# Patient Record
Sex: MALE | Race: WHITE | Employment: STUDENT | URBAN - METROPOLITAN AREA
[De-identification: names, ages, dates, MRNs, and addresses within clinical notes are randomized per-mention and may not be internally consistent; named-entity substitution may affect disease eponyms.]

---

## 2019-03-19 ENCOUNTER — THERAPY VISIT (OUTPATIENT)
Dept: PHYSICAL THERAPY | Facility: CLINIC | Age: 24
End: 2019-03-19
Payer: COMMERCIAL

## 2019-03-19 DIAGNOSIS — Z98.890 S/P ACL RECONSTRUCTION: ICD-10-CM

## 2019-03-19 DIAGNOSIS — R60.0 LOCALIZED EDEMA: ICD-10-CM

## 2019-03-19 DIAGNOSIS — S83.511D RUPTURE OF ANTERIOR CRUCIATE LIGAMENT OF RIGHT KNEE, SUBSEQUENT ENCOUNTER: ICD-10-CM

## 2019-03-19 DIAGNOSIS — M25.561 ACUTE PAIN OF RIGHT KNEE: ICD-10-CM

## 2019-03-19 PROBLEM — S83.511A RIGHT ACL TEAR: Status: ACTIVE | Noted: 2019-03-19

## 2019-03-19 PROCEDURE — 97016 VASOPNEUMATIC DEVICE THERAPY: CPT | Mod: GP | Performed by: PHYSICAL THERAPIST

## 2019-03-19 PROCEDURE — 97161 PT EVAL LOW COMPLEX 20 MIN: CPT | Mod: GP | Performed by: PHYSICAL THERAPIST

## 2019-03-19 PROCEDURE — 97110 THERAPEUTIC EXERCISES: CPT | Mod: GP | Performed by: PHYSICAL THERAPIST

## 2019-03-19 ASSESSMENT — ACTIVITIES OF DAILY LIVING (ADL)
STAND: ACTIVITY IS FAIRLY DIFFICULT
WEAKNESS: THE SYMPTOM AFFECTS MY ACTIVITY SEVERELY
KNEEL ON THE FRONT OF YOUR KNEE: I AM UNABLE TO DO THE ACTIVITY
HOW_WOULD_YOU_RATE_THE_CURRENT_FUNCTION_OF_YOUR_KNEE_DURING_YOUR_USUAL_DAILY_ACTIVITIES_ON_A_SCALE_FROM_0_TO_100_WITH_100_BEING_YOUR_LEVEL_OF_KNEE_FUNCTION_PRIOR_TO_YOUR_INJURY_AND_0_BEING_THE_INABILITY_TO_PERFORM_ANY_OF_YOUR_USUAL_DAILY_ACTIVITIES?: 10
SIT WITH YOUR KNEE BENT: I AM UNABLE TO DO THE ACTIVITY
GO UP STAIRS: ACTIVITY IS FAIRLY DIFFICULT
GO DOWN STAIRS: ACTIVITY IS FAIRLY DIFFICULT
LIMPING: THE SYMPTOM PREVENTS ME FROM ALL DAILY ACTIVITIES
WALK: I AM UNABLE TO DO THE ACTIVITY
SQUAT: I AM UNABLE TO DO THE ACTIVITY
SWELLING: THE SYMPTOM AFFECTS MY ACTIVITY MODERATELY
RISE FROM A CHAIR: ACTIVITY IS SOMEWHAT DIFFICULT
AS_A_RESULT_OF_YOUR_KNEE_INJURY,_HOW_WOULD_YOU_RATE_YOUR_CURRENT_LEVEL_OF_DAILY_ACTIVITY?: SEVERELY ABNORMAL
GIVING WAY, BUCKLING OR SHIFTING OF KNEE: THE SYMPTOM PREVENTS ME FROM ALL DAILY ACTIVITIES
STIFFNESS: THE SYMPTOM AFFECTS MY ACTIVITY SEVERELY
HOW_WOULD_YOU_RATE_THE_OVERALL_FUNCTION_OF_YOUR_KNEE_DURING_YOUR_USUAL_DAILY_ACTIVITIES?: SEVERELY ABNORMAL
KNEE_ACTIVITY_OF_DAILY_LIVING_SUM: 14
PAIN: THE SYMPTOM AFFECTS MY ACTIVITY SEVERELY
KNEE_ACTIVITY_OF_DAILY_LIVING_SCORE: 20
RAW_SCORE: 14

## 2019-03-19 NOTE — LETTER
[facility]  [address]  [place] [address]  [phone]    2019    Re: Benton Bernardo   :   1995  MRN:  [id number]   REFERRING PHYSICIAN:   [facility]    Date of Initial Evaluation:  3/19/19  Visits:  Rxs Used: 1  Reason for Referral:     Acute pain of right knee  Rupture of anterior cruciate ligament of right knee, subsequent encounter  S/P ACL reconstruction  Localized [facility] Initial Evaluation    Subjective:  The history is provided by the patient.   Benton Bernardo is a 23 year old male with a right knee condition.  Condition occurred with:  A twist.  Condition occurred: during recreation/sport.  This is a new and chronic condition  3/18/19 patient underwent right ACL-R with BTB autograft.  He originally injured his knee in Fall  while playing flag football..    Patient reports pain:  Anterior and in the joint.    Pain is described as aching and sharp and is constant and reported as 6/10.  Associated symptoms:  Buckling/giving out, edema, loss of strength, loss of motion/stiffness and numbness. Pain is the same all the time.  Symptoms are exacerbated by ascending stairs, descending stairs, bending/squatting, kneeling, walking and standing and relieved by ice, rest and bracing/immobilizing.  Since onset symptoms are gradually improving.  Special tests:  MRI and x-ray.  Previous treatment includes surgery.  Improvement with previous treatment: too early to assess improvement.  General health as reported by patient is excellent.  Pertinent medical history includes:  History of fractures.  Medical allergies: no.  Other surgeries include:  Orthopedic surgery (hands).  Current medications:  Pain medication (antibiotic).  Current occupation is Student, .  Patient is currently not working due to present treatment problem.  Primary job tasks include:  Other and prolonged  sitting (pushing/pulling, computer work).  Barriers include:  Transportation.  Red flags:  None as reported by the patient.    Objective:  KNEE:    PROM:   L  R   Hyperextension 2 0   Extension 0 4   Flexion 140+ 65     Strength:   L R   HIP     Flex 5/5 nt   Ext 5/5 nt   Abd 5/5 nt   KNEE     Flex 5/5 nt   Ext 5/5 Nt, fair quad set     Palpation: incisional tenderness    Patellar tracking: fair superior patellar translation with quad set    Edema:  Significant swelling noted in right knee    Observation: wound check shows drainage at site of proximal/lateral incision, clean in anterior incision    Gait: presents NWB with bilateral axillary crutches with R knee locked in full extension in brace    Assessment/Plan:    Patient is a 23 year old male with right side knee complaints.    Patient has the following significant findings with corresponding treatment plan.                Diagnosis 1:  S/p Right ACL-R with BTB autograft    Pain -  hot/cold therapy, self management and education  Decreased ROM/flexibility - manual therapy, therapeutic exercise and home program  Decreased strength - therapeutic exercise, therapeutic activities and home program  Decreased proprioception - neuro re-education, therapeutic activities and home program  Edema - vasopneumatics, cold therapy and self management/home program  Impaired gait - gait training and home program  Impaired muscle performance - neuro re-education and home program  Decreased function - therapeutic activities and home program    Therapy Evaluation Codes:   1) History comprised of:   Personal factors that impact the plan of care:      None.    Comorbidity factors that impact the plan of care are:      Pain at night/rest.     Medications impacting care: Pain.  2) Examination of Body Systems comprised of:   Body structures and functions that impact the plan of care:      Knee.   Activity limitations that impact the plan of care are:      Bathing, Driving, Jumping,  Running, Sports, Squatting/kneeling, Stairs, Standing and Walking.  3) Clinical presentation characteristics are:   Stable/Uncomplicated.  4) Decision-Making    Low complexity using standardized patient assessment instrument and/or measureable assessment of functional outcome.    Cumulative Therapy Evaluation is: Low complexity.  Previous and current functional limitations:  (See Goal Flow Sheet for this information)    Short term and Long term goals: (See Goal Flow Sheet for this information)   Communication ability:  Patient appears to be able to clearly communicate and understand verbal and written communication and follow directions correctly.  Treatment Explanation - The following has been discussed with the patient:   RX ordered/plan of care  Anticipated outcomes  Possible risks and side effects  This patient would benefit from PT intervention to resume normal activities.   Rehab potential is good.    Frequency:  2 X week, once daily  Duration:  for 1 week, then patient will return to school in [place], [place] for 6 weeks to continue PT. He will resume PT at this site in May 2019  Discharge Plan:  Achieve all LTG.  Independent in home treatment program.  Reach maximal therapeutic benefit.    Thank you for your referral.    INQUIRIES  Therapist: Nam Haider, PT, DPT, SCS  [facility]  [address] [place] [address]  Phone: [phone]  Fax: [phone]

## 2019-03-19 NOTE — PROGRESS NOTES
[facility] Initial Evaluation  Subjective:  The history is provided by the patient.   Benton Bernardo is a 23 year old male with a right knee condition.  Condition occurred with:  A twist.  Condition occurred: during recreation/sport.  This is a new and chronic condition  3/18/19 patient underwent right ACL-R with BTB autograft.  He originally injured his knee in Fall 2018 while playing flag football..    Patient reports pain:  Anterior and in the joint.    Pain is described as aching and sharp and is constant and reported as 6/10.  Associated symptoms:  Buckling/giving out, edema, loss of strength, loss of motion/stiffness and numbness. Pain is the same all the time.  Symptoms are exacerbated by ascending stairs, descending stairs, bending/squatting, kneeling, walking and standing and relieved by ice, rest and bracing/immobilizing.  Since onset symptoms are gradually improving.  Special tests:  MRI and x-ray.  Previous treatment includes surgery.  Improvement with previous treatment: too early to assess improvement.  General health as reported by patient is excellent.  Pertinent medical history includes:  History of fractures.  Medical allergies: no.  Other surgeries include:  Orthopedic surgery (hands).  Current medications:  Pain medication (antibiotic).  Current occupation is Student, .  Patient is currently not working due to present treatment problem.  Primary job tasks include:  Other and prolonged sitting (pushing/pulling, computer work).    Barriers include:  Transportation.    Red flags:  None as reported by the patient.                        Objective:  KNEE:    PROM:   L  R   Hyperextension 2 0   Extension 0 4   Flexion 140+ 65     Strength:   L R   HIP     Flex 5/5 nt   Ext 5/5 nt   Abd 5/5 nt   KNEE     Flex 5/5 nt   Ext 5/5 Nt, fair quad set     Palpation: incisional tenderness    Patellar tracking: fair superior patellar translation with quad  set    Edema:  Significant swelling noted in right knee    Observation: wound check shows drainage at site of proximal/lateral incision, clean in anterior incision    Gait: presents NWB with bilateral axillary crutches with R knee locked in full extension in brace        System    Physical Exam    General     ROS    Assessment/Plan:    Patient is a 23 year old male with right side knee complaints.    Patient has the following significant findings with corresponding treatment plan.                Diagnosis 1:  S/p Right ACL-R with BTB autograft    Pain -  hot/cold therapy, self management and education  Decreased ROM/flexibility - manual therapy, therapeutic exercise and home program  Decreased strength - therapeutic exercise, therapeutic activities and home program  Decreased proprioception - neuro re-education, therapeutic activities and home program  Edema - vasopneumatics, cold therapy and self management/home program  Impaired gait - gait training and home program  Impaired muscle performance - neuro re-education and home program  Decreased function - therapeutic activities and home program    Therapy Evaluation Codes:   1) History comprised of:   Personal factors that impact the plan of care:      None.    Comorbidity factors that impact the plan of care are:      Pain at night/rest.     Medications impacting care: Pain.  2) Examination of Body Systems comprised of:   Body structures and functions that impact the plan of care:      Knee.   Activity limitations that impact the plan of care are:      Bathing, Driving, Jumping, Running, Sports, Squatting/kneeling, Stairs, Standing and Walking.  3) Clinical presentation characteristics are:   Stable/Uncomplicated.  4) Decision-Making    Low complexity using standardized patient assessment instrument and/or measureable assessment of functional outcome.  Cumulative Therapy Evaluation is: Low complexity.    Previous and current functional limitations:  (See Goal Flow  Sheet for this information)    Short term and Long term goals: (See Goal Flow Sheet for this information)     Communication ability:  Patient appears to be able to clearly communicate and understand verbal and written communication and follow directions correctly.  Treatment Explanation - The following has been discussed with the patient:   RX ordered/plan of care  Anticipated outcomes  Possible risks and side effects  This patient would benefit from PT intervention to resume normal activities.   Rehab potential is good.    Frequency:  2 X week, once daily  Duration:  for 1 week, then patient will return to school in [place], [place] for 6 weeks to continue PT. He will resume PT at this site in May 2019  Discharge Plan:  Achieve all LTG.  Independent in home treatment program.  Reach maximal therapeutic benefit.    Please refer to the daily flowsheet for treatment today, total treatment time and time spent performing 1:1 timed codes.

## 2019-03-22 ENCOUNTER — THERAPY VISIT (OUTPATIENT)
Dept: PHYSICAL THERAPY | Facility: CLINIC | Age: 24
End: 2019-03-22
Payer: COMMERCIAL

## 2019-03-22 DIAGNOSIS — R60.0 LOCALIZED EDEMA: ICD-10-CM

## 2019-03-22 DIAGNOSIS — M25.561 ACUTE PAIN OF RIGHT KNEE: ICD-10-CM

## 2019-03-22 DIAGNOSIS — S83.511D RUPTURE OF ANTERIOR CRUCIATE LIGAMENT OF RIGHT KNEE, SUBSEQUENT ENCOUNTER: ICD-10-CM

## 2019-03-22 DIAGNOSIS — Z98.890 S/P ACL RECONSTRUCTION: ICD-10-CM

## 2019-03-22 PROCEDURE — 97110 THERAPEUTIC EXERCISES: CPT | Mod: GP | Performed by: PHYSICAL THERAPIST

## 2019-03-22 PROCEDURE — 97016 VASOPNEUMATIC DEVICE THERAPY: CPT | Mod: GP | Performed by: PHYSICAL THERAPIST

## 2019-03-22 NOTE — LETTER
[facility]  [address]  [place] [address]  [phone]    2019    Re: Benton Bernardo   :   1995  MRN:  [id number]   REFERRING PHYSICIAN:   [facility]    Date of Initial Evaluation:  3/19/19  Visits:  Rxs Used: 2  Reason for Referral:     Acute pain of right knee  Rupture of anterior cruciate ligament of right knee, subsequent encounter  S/P ACL reconstruction  Localized edema    PROGRESS  REPORT  Progress reporting period is from 3/19/19 to 3/22/19.       SUBJECTIVE  Subjective changes noted by patient:  Benton reports his knee is doing pretty well. The bending is improving and the swelling is coming down a little bit.  The knee is still pretty sore but getting less painful.  He will return to [place], [place] for school this  where he will resume his PT.  He will return to this area in 8 weeks and resume PT at this location at that time.    Current pain level is 4/10.     Previous pain level was 6/10.   Changes in function:  Yes, see above.  Adverse reaction to treatment or activity: None    OBJECTIVE  Changes noted in objective findings:  Yes  Right knee PROM:  0-0-98    Able to make full revolution on stationary bike.    Good squat set.  Able to perform SLR independently with brace locked in extension.    Right knee joint line circumference 46.5 cm vs 41.2 cm on left.     ASSESSMENT/PLAN  Updated problem list and treatment plan: Diagnosis 1:  S/p Right ACL-R with BTB autograft    Pain -  hot/cold therapy, splint/taping/bracing/orthotics, self management, education and home program  Decreased ROM/flexibility - manual therapy, therapeutic exercise and home program  Decreased strength - therapeutic exercise, therapeutic activities and home program  Decreased proprioception - neuro re-education, therapeutic activities and home program  Edema - vasopneumatics, cold therapy and self management/home program  Impaired gait - gait  training and home program  Decreased function - therapeutic activities, home program and functional performance testing  STG/LTGs have been met or progress has been made towards goals:  Yes (See Goal flow sheet completed today.)  Assessment of Progress: The patient's condition is improving.  Self Management Plans:  Patient has been instructed in a home treatment program.  Patient  has been instructed in self management of symptoms.    Benton continues to require the following intervention to meet STG and LTG's:  PT    Recommendations:  Benton will continue PT in [place] over the next 8 weeks with the frequency and duration to be determined by the therapist there.  He will resume PT at this facility when he returns from school and the frequency and duration will be determined at that time.    Thank you for your referral.    INQUIRIES  Therapist: Nam Haider, PT, DPT, SCS  [facility]  [address] [place] [address]  Phone: [phone]  Fax: [phone]

## 2019-03-22 NOTE — PROGRESS NOTES
Subjective:  HPI                    Objective:  System    Physical Exam    General     ROS    Assessment/Plan:    PROGRESS  REPORT    Progress reporting period is from 3/19/19 to 3/22/19.       SUBJECTIVE  Subjective changes noted by patient:  Benton reports his knee is doing pretty well. The bending is improving and the swelling is coming down a little bit.  The knee is still pretty sore but getting less painful.  He will return to [place], [place] for school this Sunday where he will resume his PT.  He will return to this area in 8 weeks and resume PT at this location at that time.    Current pain level is 4/10.     Previous pain level was 6/10.   Changes in function:  Yes, see above.  Adverse reaction to treatment or activity: None    OBJECTIVE  Changes noted in objective findings:  Yes  Right knee PROM:  0-0-98    Able to make full revolution on stationary bike.    Good squat set.  Able to perform SLR independently with brace locked in extension.    Right knee joint line circumference 46.5 cm vs 41.2 cm on left.     ASSESSMENT/PLAN  Updated problem list and treatment plan: Diagnosis 1:  S/p Right ACL-R with BTB autograft    Pain -  hot/cold therapy, splint/taping/bracing/orthotics, self management, education and home program  Decreased ROM/flexibility - manual therapy, therapeutic exercise and home program  Decreased strength - therapeutic exercise, therapeutic activities and home program  Decreased proprioception - neuro re-education, therapeutic activities and home program  Edema - vasopneumatics, cold therapy and self management/home program  Impaired gait - gait training and home program  Decreased function - therapeutic activities, home program and functional performance testing  STG/LTGs have been met or progress has been made towards goals:  Yes (See Goal flow sheet completed today.)  Assessment of Progress: The patient's condition is improving.  Self Management Plans:  Patient has been instructed in a home  treatment program.  Patient  has been instructed in self management of symptoms.    Benton continues to require the following intervention to meet STG and LTG's:  PT    Recommendations:  Benton will continue PT in [place] over the next 8 weeks with the frequency and duration to be determined by the therapist there.  He will resume PT at this facility when he returns from school and the frequency and duration will be determined at that time.    Please refer to the daily flowsheet for treatment today, total treatment time and time spent performing 1:1 timed codes.

## 2019-06-12 ENCOUNTER — THERAPY VISIT (OUTPATIENT)
Dept: PHYSICAL THERAPY | Facility: CLINIC | Age: 24
End: 2019-06-12
Payer: COMMERCIAL

## 2019-06-12 DIAGNOSIS — Z98.890 S/P ACL RECONSTRUCTION: ICD-10-CM

## 2019-06-12 DIAGNOSIS — R60.0 LOCALIZED EDEMA: ICD-10-CM

## 2019-06-12 DIAGNOSIS — M25.561 ACUTE PAIN OF RIGHT KNEE: ICD-10-CM

## 2019-06-12 DIAGNOSIS — S83.511D RUPTURE OF ANTERIOR CRUCIATE LIGAMENT OF RIGHT KNEE, SUBSEQUENT ENCOUNTER: ICD-10-CM

## 2019-06-12 PROCEDURE — 97750 PHYSICAL PERFORMANCE TEST: CPT | Mod: GP | Performed by: PHYSICAL THERAPIST

## 2019-06-12 PROCEDURE — 97530 THERAPEUTIC ACTIVITIES: CPT | Mod: GP | Performed by: PHYSICAL THERAPIST

## 2019-06-12 PROCEDURE — 97110 THERAPEUTIC EXERCISES: CPT | Mod: GP | Performed by: PHYSICAL THERAPIST

## 2019-06-12 NOTE — LETTER
[facility]  [address]  [place] [address]  [phone]    2019    Re: Benton Bernardo   :   1995  MRN:  [id number]   REFERRING PHYSICIAN:   [facility]    Date of Initial Evaluation:  3/22/19  Visits:  Rxs Used: 3  Reason for Referral:     Acute pain of right knee  Rupture of anterior cruciate ligament of right knee, subsequent encounter  S/P ACL reconstruction  Localized edema    PROGRESS  REPORT  Progress reporting period is from 3/22/19 to 19.       SUBJECTIVE  Subjective changes noted by patient:  Benton reports his knee is doing well. He has been doing PT in [place], [place] while at school and returns now 12 weeks s/p ACL reconstruction.  He reports no issues with pain or swelling. His motion feels pretty much fully restored and his strength is improving in the surgical limb.  He has not yet started a running program.    Current pain level is 0/10.     Previous pain level was 6/10.   Changes in function:  Yes, see above.  Adverse reaction to treatment or activity: None    OBJECTIVE  Changes noted in objective findings:  Yes  Right knee PROM: 2-0-150    Right knee joint line  40.7 cm  vs 40.5 cm on left    Right quad girth 15 cm proximal  55.5 cm  vs 57.5 cm  on left.    Single leg squat depth:  Right 91 degrees  Left 103 degrees    Single leg squat endurance test:     Right 60/60 reps completed with 4/5 RPE     Left 60/60 reps completed with 4/5 RPE    Good jogging form, no limping     ASSESSMENT/PLAN  Updated problem list and treatment plan: Diagnosis 1:  S/p R ACL reconstruction with BTB autograft    Decreased strength - therapeutic exercise, therapeutic activities and home program  Decreased proprioception - neuro re-education, therapeutic activities and home program  Decreased function - therapeutic activities, home program and functional performance testing  STG/LTGs have been met or progress has been made towards goals:   Yes (See Goal flow sheet completed today.)  Assessment of Progress: The patient's condition is improving.  The patient's condition has potential to improve.  Patient is meeting short term goals and is progressing towards long term goals.  Self Management Plans:  Patient has been instructed in a home treatment program.  Patient  has been instructed in self management of symptoms.    Benton continues to require the following intervention to meet STG and LTG's:  PT    Recommendations:  This patient would benefit from continued therapy.     Frequency:  1 X week, once daily  Duration:  for 12 weeks    Thank you for your referral.    INQUIRIES  Therapist: Nam Haider, PT, DPT, SCS   [facility]  [address] [place] [address]  Phone: [phone]  Fax: [phone]

## 2019-06-12 NOTE — PROGRESS NOTES
Subjective:  HPI                    Objective:  System    Physical Exam    General     ROS    Assessment/Plan:    PROGRESS  REPORT    Progress reporting period is from 3/22/19 to 6/12/19.       SUBJECTIVE  Subjective changes noted by patient:  Benton reports his knee is doing well. He has been doing PT in [place], [place] while at school and returns now 12 weeks s/p ACL reconstruction.  He reports no issues with pain or swelling. His motion feels pretty much fully restored and his strength is improving in the surgical limb.  He has not yet started a running program.    Current pain level is 0/10.     Previous pain level was 6/10.   Changes in function:  Yes, see above.  Adverse reaction to treatment or activity: None    OBJECTIVE  Changes noted in objective findings:  Yes  Right knee PROM: 2-0-150    Right knee joint line  40.7 cm  vs 40.5 cm on left    Right quad girth 15 cm proximal  55.5 cm  vs 57.5 cm  on left.    Single leg squat depth:  Right 91 degrees  Left 103 degrees    Single leg squat endurance test:     Right 60/60 reps completed with 4/5 RPE     Left 60/60 reps completed with 4/5 RPE    Good jogging form, no limping     ASSESSMENT/PLAN  Updated problem list and treatment plan: Diagnosis 1:  S/p R ACL reconstruction with BTB autograft    Decreased strength - therapeutic exercise, therapeutic activities and home program  Decreased proprioception - neuro re-education, therapeutic activities and home program  Decreased function - therapeutic activities, home program and functional performance testing  STG/LTGs have been met or progress has been made towards goals:  Yes (See Goal flow sheet completed today.)  Assessment of Progress: The patient's condition is improving.  The patient's condition has potential to improve.  Patient is meeting short term goals and is progressing towards long term goals.  Self Management Plans:  Patient has been instructed in a home treatment program.  Patient  has been instructed in  self management of symptoms.    Benton continues to require the following intervention to meet STG and LTG's:  PT    Recommendations:  This patient would benefit from continued therapy.     Frequency:  1 X week, once daily  Duration:  for 12 weeks        Please refer to the daily flowsheet for treatment today, total treatment time and time spent performing 1:1 timed codes.

## 2019-06-19 ENCOUNTER — THERAPY VISIT (OUTPATIENT)
Dept: PHYSICAL THERAPY | Facility: CLINIC | Age: 24
End: 2019-06-19
Payer: COMMERCIAL

## 2019-06-19 DIAGNOSIS — M25.561 ACUTE PAIN OF RIGHT KNEE: ICD-10-CM

## 2019-06-19 DIAGNOSIS — R60.0 LOCALIZED EDEMA: ICD-10-CM

## 2019-06-19 DIAGNOSIS — Z98.890 S/P ACL RECONSTRUCTION: ICD-10-CM

## 2019-06-19 DIAGNOSIS — S83.511D RUPTURE OF ANTERIOR CRUCIATE LIGAMENT OF RIGHT KNEE, SUBSEQUENT ENCOUNTER: ICD-10-CM

## 2019-06-19 PROCEDURE — 97110 THERAPEUTIC EXERCISES: CPT | Mod: GP | Performed by: PHYSICAL THERAPIST

## 2019-06-19 PROCEDURE — 97010 HOT OR COLD PACKS THERAPY: CPT | Mod: GP | Performed by: PHYSICAL THERAPIST

## 2019-06-19 PROCEDURE — 97112 NEUROMUSCULAR REEDUCATION: CPT | Mod: GP | Performed by: PHYSICAL THERAPIST

## 2019-06-26 ENCOUNTER — THERAPY VISIT (OUTPATIENT)
Dept: PHYSICAL THERAPY | Facility: CLINIC | Age: 24
End: 2019-06-26
Payer: COMMERCIAL

## 2019-06-26 DIAGNOSIS — R60.0 LOCALIZED EDEMA: ICD-10-CM

## 2019-06-26 DIAGNOSIS — Z98.890 S/P ACL RECONSTRUCTION: ICD-10-CM

## 2019-06-26 DIAGNOSIS — M25.561 ACUTE PAIN OF RIGHT KNEE: ICD-10-CM

## 2019-06-26 DIAGNOSIS — S83.511D RUPTURE OF ANTERIOR CRUCIATE LIGAMENT OF RIGHT KNEE, SUBSEQUENT ENCOUNTER: ICD-10-CM

## 2019-06-26 PROCEDURE — 97110 THERAPEUTIC EXERCISES: CPT | Mod: GP | Performed by: PHYSICAL THERAPIST

## 2019-06-26 PROCEDURE — 97530 THERAPEUTIC ACTIVITIES: CPT | Mod: GP | Performed by: PHYSICAL THERAPIST

## 2019-06-26 PROCEDURE — 97112 NEUROMUSCULAR REEDUCATION: CPT | Mod: GP | Performed by: PHYSICAL THERAPIST

## 2019-06-26 PROCEDURE — 97010 HOT OR COLD PACKS THERAPY: CPT | Mod: GP | Performed by: PHYSICAL THERAPIST

## 2019-07-25 ENCOUNTER — THERAPY VISIT (OUTPATIENT)
Dept: PHYSICAL THERAPY | Facility: CLINIC | Age: 24
End: 2019-07-25
Payer: COMMERCIAL

## 2019-07-25 DIAGNOSIS — Z98.890 S/P ACL RECONSTRUCTION: ICD-10-CM

## 2019-07-25 DIAGNOSIS — S83.511D RUPTURE OF ANTERIOR CRUCIATE LIGAMENT OF RIGHT KNEE, SUBSEQUENT ENCOUNTER: ICD-10-CM

## 2019-07-25 DIAGNOSIS — R60.0 LOCALIZED EDEMA: ICD-10-CM

## 2019-07-25 DIAGNOSIS — M25.561 ACUTE PAIN OF RIGHT KNEE: ICD-10-CM

## 2019-07-25 PROCEDURE — 97110 THERAPEUTIC EXERCISES: CPT | Mod: GP | Performed by: PHYSICAL THERAPIST

## 2019-07-25 PROCEDURE — 97112 NEUROMUSCULAR REEDUCATION: CPT | Mod: GP | Performed by: PHYSICAL THERAPIST

## 2019-07-25 PROCEDURE — 97530 THERAPEUTIC ACTIVITIES: CPT | Mod: GP | Performed by: PHYSICAL THERAPIST

## 2019-08-02 ENCOUNTER — THERAPY VISIT (OUTPATIENT)
Dept: PHYSICAL THERAPY | Facility: CLINIC | Age: 24
End: 2019-08-02
Payer: COMMERCIAL

## 2019-08-02 DIAGNOSIS — Z98.890 S/P ACL RECONSTRUCTION: ICD-10-CM

## 2019-08-02 DIAGNOSIS — S83.511D RUPTURE OF ANTERIOR CRUCIATE LIGAMENT OF RIGHT KNEE, SUBSEQUENT ENCOUNTER: ICD-10-CM

## 2019-08-02 DIAGNOSIS — R60.0 LOCALIZED EDEMA: ICD-10-CM

## 2019-08-02 DIAGNOSIS — M25.561 ACUTE PAIN OF RIGHT KNEE: ICD-10-CM

## 2019-08-02 PROCEDURE — 97530 THERAPEUTIC ACTIVITIES: CPT | Mod: GP | Performed by: PHYSICAL THERAPIST

## 2019-08-02 PROCEDURE — 97112 NEUROMUSCULAR REEDUCATION: CPT | Mod: GP | Performed by: PHYSICAL THERAPIST

## 2019-08-07 ENCOUNTER — THERAPY VISIT (OUTPATIENT)
Dept: PHYSICAL THERAPY | Facility: CLINIC | Age: 24
End: 2019-08-07
Payer: COMMERCIAL

## 2019-08-07 DIAGNOSIS — R60.0 LOCALIZED EDEMA: ICD-10-CM

## 2019-08-07 DIAGNOSIS — Z98.890 S/P ACL RECONSTRUCTION: ICD-10-CM

## 2019-08-07 DIAGNOSIS — S83.511D RUPTURE OF ANTERIOR CRUCIATE LIGAMENT OF RIGHT KNEE, SUBSEQUENT ENCOUNTER: ICD-10-CM

## 2019-08-07 DIAGNOSIS — M25.561 ACUTE PAIN OF RIGHT KNEE: ICD-10-CM

## 2019-08-07 PROCEDURE — 97530 THERAPEUTIC ACTIVITIES: CPT | Mod: GP | Performed by: PHYSICAL THERAPIST

## 2019-08-07 PROCEDURE — 97112 NEUROMUSCULAR REEDUCATION: CPT | Mod: GP | Performed by: PHYSICAL THERAPIST

## 2019-08-07 PROCEDURE — 97110 THERAPEUTIC EXERCISES: CPT | Mod: GP | Performed by: PHYSICAL THERAPIST

## 2019-08-14 ENCOUNTER — THERAPY VISIT (OUTPATIENT)
Dept: PHYSICAL THERAPY | Facility: CLINIC | Age: 24
End: 2019-08-14
Payer: COMMERCIAL

## 2019-08-14 DIAGNOSIS — R60.0 LOCALIZED EDEMA: ICD-10-CM

## 2019-08-14 DIAGNOSIS — S83.511D RUPTURE OF ANTERIOR CRUCIATE LIGAMENT OF RIGHT KNEE, SUBSEQUENT ENCOUNTER: ICD-10-CM

## 2019-08-14 DIAGNOSIS — Z98.890 S/P ACL RECONSTRUCTION: ICD-10-CM

## 2019-08-14 DIAGNOSIS — M25.561 ACUTE PAIN OF RIGHT KNEE: ICD-10-CM

## 2019-08-14 PROCEDURE — 97750 PHYSICAL PERFORMANCE TEST: CPT | Mod: GP | Performed by: PHYSICAL THERAPIST

## 2019-08-14 PROCEDURE — 97530 THERAPEUTIC ACTIVITIES: CPT | Mod: GP | Performed by: PHYSICAL THERAPIST

## 2019-08-14 PROCEDURE — 97112 NEUROMUSCULAR REEDUCATION: CPT | Mod: GP | Performed by: PHYSICAL THERAPIST

## 2019-08-14 ASSESSMENT — ACTIVITIES OF DAILY LIVING (ADL)
GO UP STAIRS: ACTIVITY IS NOT DIFFICULT
HOW_WOULD_YOU_RATE_THE_CURRENT_FUNCTION_OF_YOUR_KNEE_DURING_YOUR_USUAL_DAILY_ACTIVITIES_ON_A_SCALE_FROM_0_TO_100_WITH_100_BEING_YOUR_LEVEL_OF_KNEE_FUNCTION_PRIOR_TO_YOUR_INJURY_AND_0_BEING_THE_INABILITY_TO_PERFORM_ANY_OF_YOUR_USUAL_DAILY_ACTIVITIES?: 85
GIVING WAY, BUCKLING OR SHIFTING OF KNEE: I DO NOT HAVE THE SYMPTOM
AS_A_RESULT_OF_YOUR_KNEE_INJURY,_HOW_WOULD_YOU_RATE_YOUR_CURRENT_LEVEL_OF_DAILY_ACTIVITY?: NEARLY NORMAL
SIT WITH YOUR KNEE BENT: ACTIVITY IS NOT DIFFICULT
LIMPING: I DO NOT HAVE THE SYMPTOM
SWELLING: I DO NOT HAVE THE SYMPTOM
PAIN: I DO NOT HAVE THE SYMPTOM
STAND: ACTIVITY IS NOT DIFFICULT
WEAKNESS: THE SYMPTOM AFFECTS MY ACTIVITY SLIGHTLY
GO DOWN STAIRS: ACTIVITY IS NOT DIFFICULT
HOW_WOULD_YOU_RATE_THE_OVERALL_FUNCTION_OF_YOUR_KNEE_DURING_YOUR_USUAL_DAILY_ACTIVITIES?: NEARLY NORMAL
RISE FROM A CHAIR: ACTIVITY IS NOT DIFFICULT
KNEE_ACTIVITY_OF_DAILY_LIVING_SUM: 66
KNEE_ACTIVITY_OF_DAILY_LIVING_SCORE: 94.29
STIFFNESS: I HAVE THE SYMPTOM BUT IT DOES NOT AFFECT MY ACTIVITY
RAW_SCORE: 66
WALK: ACTIVITY IS NOT DIFFICULT
KNEEL ON THE FRONT OF YOUR KNEE: ACTIVITY IS MINIMALLY DIFFICULT
SQUAT: ACTIVITY IS NOT DIFFICULT

## 2019-08-14 NOTE — LETTER
[facility]  [address]  [place] [address]  [phone]    2019    Re: Benton Bernardo   :   1995  MRN:  [id number]   REFERRING PHYSICIAN:   [facility]    Date of Initial Evaluation:  19  Visits:  Rxs Used: 9  Reason for Referral:     Acute pain of right knee  Rupture of anterior cruciate ligament of right knee, subsequent encounter  S/P ACL reconstruction  Localized edema    Lower Extremity Physical Performance Testing    Surgery/Injury: ACL reconstruction    Involved Extremity: right   Date of Surgery/Injury: 3/18/19    Surgeon/MD: Dr. Bulmaro Herman  Therapist performing test/Primary Treating Therapist: Nam Haider, DPT, SCS    Patient subjective symptom/function report: Benton reports his knee is feeling really good.  He has no issues with pain, swelling, or instability. He has no limits in his daily activities but notes he would benefit from high level training to regain athletic movements as well as full confidence in his surgical knee.    LSI% =Limb Symmetry Index (score comparison between involved/uninvolved extremity)    Single Leg Squat    Uninvolved Extremity 125 degrees   Involved Extremity 120 degrees   LSI% 96%       Return to Fitness (Level II): Muscle Endurance, Power. Allowed at or after 4-6 months post-op ACL     Return to Fitness (Level II): Muscle Endurance, Power   Level II Functional Prerequisites:   1) All Level I tests ?85% of uninvolved side or maximal value, and  2) Bilateral squats ?90  knee flexion x 20 reps with good trunk, L/E alignment control.    Perceived Exertion Ratin to 5 point scale, (0) Very Easy << >> (5) Maximal Exertion.  2 verbal cuing episodes allowed for alignment correction before testing terminated.  Only reps completed with good alignment counted toward total reps.    Single Leg Hop    Uninvolved Extremity 75.5 in   Involved Extremity 70.5 in   LSI% 93.4%       Return to Sport  (Level III): Power. Allowed at or after 6-9 months post-op ACL     Return to Sport (Level III): Power   Level III Functional Prerequisites:   1) All Level I tests ?85% of uninvolved side or maximal value, and  2) All Level II tests ?85% of uninvolved side.    6M Timed Hop    Uninvolved Extremity 1.55 seconds   Involved Extremity 1.60 seconds   LSI% 96.9%     Single Leg Crossover Hop    Uninvolved Extremity 229 in   Involved Extremity 199 in   LSI% 86.9%       Assessment/Plan:  Benton is progressing very well with his ACL-R rehab and has advanced to the point where he is physically ready to start the Next Step program to continue advancing his strength, neuromuscular control, agility, and confidence.    Exercises Instructed per Test Findings:  1) Next Step program    Thank you for your referral.    INQUIRIES  Therapist: Nam Haider, PT, DPT, SCS  [facility]  [address] [place] [address]  Phone: [phone]  Fax: [phone]

## 2019-08-14 NOTE — PROGRESS NOTES
Lower Extremity Physical Performance Testing    Surgery/Injury: ACL reconstruction    Involved Extremity: right   Date of Surgery/Injury: 3/18/19    Surgeon/MD: Dr. Bulmaro Herman  Therapist performing test/Primary Treating Therapist: Nam Haider DPT, [facility]    Patient subjective symptom/function report: Benton reports his knee is feeling really good.  He has no issues with pain, swelling, or instability. He has no limits in his daily activities but notes he would benefit from high level training to regain athletic movements as well as full confidence in his surgical knee.    LSI% =Limb Symmetry Index (score comparison between involved/uninvolved extremity)    Single Leg Squat    Uninvolved Extremity 125 degrees   Involved Extremity 120 degrees   LSI% 96%       Return to Fitness (Level II): Muscle Endurance, Power. Allowed at or after 4-6 months post-op ACL     Return to Fitness (Level II): Muscle Endurance, Power   Level II Functional Prerequisites:   1) All Level I tests ?85% of uninvolved side or maximal value, and  2) Bilateral squats ?90  knee flexion x 20 reps with good trunk, L/E alignment control.    Perceived Exertion Ratin to 5 point scale, (0) Very Easy << >> (5) Maximal Exertion.  2 verbal cuing episodes allowed for alignment correction before testing terminated.  Only reps completed with good alignment counted toward total reps.    Single Leg Hop    Uninvolved Extremity 75.5 in   Involved Extremity 70.5 in   LSI% 93.4%       Return to Sport (Level III): Power. Allowed at or after 6-9 months post-op ACL     Return to Sport (Level III): Power   Level III Functional Prerequisites:   1) All Level I tests ?85% of uninvolved side or maximal value, and  2) All Level II tests ?85% of uninvolved side.    6M Timed Hop    Uninvolved Extremity 1.55 seconds   Involved Extremity 1.60 seconds   LSI% 96.9%     Single Leg Crossover Hop    Uninvolved Extremity 229 in   Involved Extremity 199 in   LSI% 86.9%              Assessment/Plan:  Benton is progressing very well with his ACL-R rehab and has advanced to the point where he is physically ready to start the Next Step program to continue advancing his strength, neuromuscular control, agility, and confidence.    Exercises Instructed per Test Findings:  1) Next Step program

## 2019-09-13 PROBLEM — M25.561 ACUTE PAIN OF RIGHT KNEE: Status: RESOLVED | Noted: 2019-03-19 | Resolved: 2019-09-13

## 2019-09-13 PROBLEM — R60.0 LOCALIZED EDEMA: Status: RESOLVED | Noted: 2019-03-19 | Resolved: 2019-09-13

## 2019-09-13 PROBLEM — S83.511A RIGHT ACL TEAR: Status: RESOLVED | Noted: 2019-03-19 | Resolved: 2019-09-13

## 2019-09-13 PROBLEM — Z98.890 S/P ACL RECONSTRUCTION: Status: RESOLVED | Noted: 2019-03-19 | Resolved: 2019-09-13

## 2019-09-13 NOTE — PROGRESS NOTES
Patient is discharged from PT.  Please refer to progress/discharge note dated 8/14/19 for last known functional status as well as final objective/subjective values.

## 2019-10-03 ENCOUNTER — ALLIED HEALTH/NURSE VISIT (OUTPATIENT)
Dept: ADMINISTRATIVE | Facility: OTHER | Age: 24
End: 2019-10-03